# Patient Record
(demographics unavailable — no encounter records)

---

## 2025-01-15 NOTE — PLAN
[FreeTextEntry1] : 20y/o presents for followup for PCOS, desires trial of metformin due to weight gain -Discussed r/b/a to metformin as well as common side effects -Will start with 500 daily with largest meal -If pt tolerates well, can increase to 500 BID -Will plan to transition management to PCP after 3 months   herlinda REYNA

## 2025-01-15 NOTE — HISTORY OF PRESENT ILLNESS
[FreeTextEntry1] : This visit was provided via telehealth using real-time 2-way audio visual technology. The patient, MAU DICKERSON  , was located at home, [address] [place] [address] , at the time of the visit.  The provider, JOHAN RUBIO , was located at the medical office located in [facility] at the time of the visit. The patient MAU DICKERSON  and Provider participated in the telehealth encounter.  Verbal consent given on 01/10/2025  by the patient, self.   20y/o presents for followup for PCOS  Pt continues on OCPs, reports monthly withdrawal bleed Reports weight gain over the past year, inquires about Metformin, reports her mother and multiple cousins have PCOS and had success with metformin

## 2025-01-15 NOTE — HISTORY OF PRESENT ILLNESS
[FreeTextEntry1] : This visit was provided via telehealth using real-time 2-way audio visual technology. The patient, MAU DICKERSON  , was located at home, [address] [place] [address] , at the time of the visit.  The provider, JOHAN RUBIO , was located at the medical office located in [facility] at the time of the visit. The patient MAU DICKERSON  and Provider participated in the telehealth encounter.  Verbal consent given on 01/10/2025  by the patient, self.   20y/o presents for followup for PCOS  Pt continues on OCPs, reports monthly withdrawal bleed Reports weight gain over the past year, inquires about Metformin, reports her mother and multiple cousins have PCOS and had success with metformin

## 2025-05-12 NOTE — PHYSICAL EXAM
[MA] : MA [Labia Majora] : normal [Labia Minora] : normal [No Bleeding] : There was no active vaginal bleeding [Normal] : normal [FreeTextEntry2] : Caron

## 2025-05-12 NOTE — PLAN
[FreeTextEntry1] : Urine POCT pregnancy test negative Vaginitis culture collected Will f/u pending culture

## 2025-05-12 NOTE — HISTORY OF PRESENT ILLNESS
[FreeTextEntry1] : This 20 yo presents c/o unscheduled vaginal spotting noted over the last couple of months; states she takes her OCP mostly consistently, but over the last 2 months she has bled pinkish, for one week she believes in the second week of the pill pack; she isn't sure if it is related to Metformin, which she has been taking daily, once; she is SA, denies any vaginal odor or irritation. No issues with voiding.

## 2025-06-11 NOTE — HISTORY OF PRESENT ILLNESS
[FreeTextEntry1] : 20 y/o F presents for annual visit.  Hx of PCOS on OCPs and metformin. Taking ovasitol supplement. Considering GLP-1 for weight management  Tolerating metformin well, currently taking 500mg daily  Pt took Plan B a few days ago due to unprotected sex after forgetting a dose of OCP Currently in third week of OCP pack  Sexually active with male partner, desires STI testing   Interim medical hx: none   HM Received Gardasil

## 2025-06-11 NOTE — PLAN
[FreeTextEntry1] : 18 y/o F presents for annual visit.   #HM -Paps to start at 21 -Received Gardasil  -Refill OCP sent -PHQ 9 administered, reviewed with pt, depression screen negative  #PCOS -Will increase metformin to 500 BID -Continue ovasitol -Discussed benefits of GLP-1 in PCOS   RTO 1 year or PRN herlinda REYNA

## 2025-06-11 NOTE — HISTORY OF PRESENT ILLNESS
[FreeTextEntry1] : 18 y/o F presents for annual visit.  Hx of PCOS on OCPs and metformin. Taking ovasitol supplement. Considering GLP-1 for weight management  Tolerating metformin well, currently taking 500mg daily  Pt took Plan B a few days ago due to unprotected sex after forgetting a dose of OCP Currently in third week of OCP pack  Sexually active with male partner, desires STI testing   Interim medical hx: none   HM Received Gardasil

## 2025-06-11 NOTE — COUNSELING
[Breast Self Exam] : breast self exam [Contraception/ Emergency Contraception/ Safe Sexual Practices] : contraception, emergency contraception, safe sexual practices [Lab Results] : lab results [STD (testing, results, tx)] : STD (testing, results, tx) [Medication Management] : medication management

## 2025-06-11 NOTE — PLAN
[FreeTextEntry1] : 20 y/o F presents for annual visit.   #HM -Paps to start at 21 -Received Gardasil  -Refill OCP sent -PHQ 9 administered, reviewed with pt, depression screen negative  #PCOS -Will increase metformin to 500 BID -Continue ovasitol -Discussed benefits of GLP-1 in PCOS   RTO 1 year or PRN herlinda REYNA

## 2025-06-11 NOTE — PHYSICAL EXAM
[Chaperoned Physical Exam] : A chaperone was present in the examining room during all aspects of the physical examination. [FreeTextEntry2] : Mehreen [MA] : MA [Appropriately responsive] : appropriately responsive [Alert] : alert [No Acute Distress] : no acute distress [Soft] : soft [Non-tender] : non-tender [Non-distended] : non-distended [No Lesions] : no lesions [No HSM] : No HSM [Oriented x3] : oriented x3 [No Mass] : no mass [Examination Of The Breasts] : a normal appearance [Normal] : normal [No Masses] : no breast masses were palpable